# Patient Record
Sex: FEMALE | Race: WHITE | NOT HISPANIC OR LATINO | URBAN - METROPOLITAN AREA
[De-identification: names, ages, dates, MRNs, and addresses within clinical notes are randomized per-mention and may not be internally consistent; named-entity substitution may affect disease eponyms.]

---

## 2020-01-01 ENCOUNTER — EMERGENCY (EMERGENCY)
Facility: HOSPITAL | Age: 35
LOS: 1 days | Discharge: HOME | End: 2020-01-01
Attending: EMERGENCY MEDICINE | Admitting: EMERGENCY MEDICINE
Payer: COMMERCIAL

## 2020-01-01 VITALS
SYSTOLIC BLOOD PRESSURE: 105 MMHG | TEMPERATURE: 98 F | OXYGEN SATURATION: 100 % | DIASTOLIC BLOOD PRESSURE: 69 MMHG | HEART RATE: 83 BPM | RESPIRATION RATE: 16 BRPM

## 2020-01-01 VITALS — DIASTOLIC BLOOD PRESSURE: 58 MMHG | SYSTOLIC BLOOD PRESSURE: 100 MMHG

## 2020-01-01 PROCEDURE — 99284 EMERGENCY DEPT VISIT MOD MDM: CPT

## 2020-01-01 RX ORDER — ONDANSETRON 8 MG/1
4 TABLET, FILM COATED ORAL ONCE
Refills: 0 | Status: COMPLETED | OUTPATIENT
Start: 2020-01-01 | End: 2020-01-01

## 2020-01-01 RX ADMIN — ONDANSETRON 4 MILLIGRAM(S): 8 TABLET, FILM COATED ORAL at 01:48

## 2020-01-01 NOTE — ED PROVIDER NOTE - PATIENT PORTAL LINK FT
You can access the FollowMyHealth Patient Portal offered by [facility] by registering at the following website: [email or web address]. By joining [facility]’s FollowMyHealth portal, you will also be able to view your health information using other applications (apps) compatible with our system.

## 2020-01-01 NOTE — ED ADULT NURSE NOTE - NSIMPLEMENTINTERV_GEN_ALL_ED
Implemented All Fall Risk Interventions:  [place] to call system. Call bell, personal items and telephone within reach. Instruct patient to call for assistance. Room bathroom lighting operational. Non-slip footwear when patient is off stretcher. Physically safe environment: no spills, clutter or unnecessary equipment. Stretcher in lowest position, wheels locked, appropriate side rails in place. Provide visual cue, wrist band, yellow gown, etc. Monitor gait and stability. Monitor for mental status changes and reorient to person, place, and time. Review medications for side effects contributing to fall risk. Reinforce activity limits and safety measures with patient and family.

## 2020-01-01 NOTE — ED ADULT TRIAGE NOTE - CHIEF COMPLAINT QUOTE
Pt brought in by EMS after pt was found on the street on [address]. Pt admits to drinking alcohol tonight. Pt vomiting at triage.

## 2020-01-01 NOTE — ED PROVIDER NOTE - OBJECTIVE STATEMENT
34 yof pw bibems for AMS, possibly 2/2 substance use, no trauma reported.  limited history 2/2 mental status.

## 2020-01-01 NOTE — ED ADULT NURSE NOTE - OBJECTIVE STATEMENT
Pt is a 34y female BIBEMS for altered mental status. Pt admits to drinking. Denies drug use. Denies fall. No obvious sings of trauma noted.

## 2020-01-07 DIAGNOSIS — F10.129 ALCOHOL ABUSE WITH INTOXICATION, UNSPECIFIED: ICD-10-CM

## 2020-01-07 DIAGNOSIS — R41.82 ALTERED MENTAL STATUS, UNSPECIFIED: ICD-10-CM

## 2022-06-09 ENCOUNTER — EMERGENCY (EMERGENCY)
Facility: HOSPITAL | Age: 37
LOS: 1 days | Discharge: HOME | End: 2022-06-09
Admitting: EMERGENCY MEDICINE
Payer: COMMERCIAL

## 2022-06-09 VITALS
OXYGEN SATURATION: 98 % | DIASTOLIC BLOOD PRESSURE: 77 MMHG | HEART RATE: 74 BPM | RESPIRATION RATE: 16 BRPM | WEIGHT: 134.92 LBS | TEMPERATURE: 98 F | HEIGHT: 69 IN | SYSTOLIC BLOOD PRESSURE: 117 MMHG

## 2022-06-09 PROCEDURE — 93970 EXTREMITY STUDY: CPT | Mod: 26

## 2022-06-09 PROCEDURE — 99284 EMERGENCY DEPT VISIT MOD MDM: CPT

## 2022-06-09 NOTE — ED PROVIDER NOTE - PATIENT PORTAL LINK FT
You can access the FollowMyHealth Patient Portal offered by [facility] by registering at the following website: [email or web address]. By joining [facility]’s FollowMyHealth portal, you will also be able to view your health information using other applications (apps) compatible with our system.

## 2022-06-09 NOTE — ED PROVIDER NOTE - OBJECTIVE STATEMENT
38 yo F, h/o AVM, DVT [not on AC], presenting to the ED c/o R lower extremity swelling and mild discomfort for the past day. She states she was previously on Xarelto for her clot, but was switched to. 36 yo F, h/o AVM, DVT [not on AC], presenting to the ED c/o R lower extremity swelling and mild discomfort for the past day. She states she was previously on Xarelto for her clot, but the medication was stopped 1 year ago by her vascular surgeon. 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 She denies direct falls/trauma to the area, headaches, dizziness, fevers, chills, chest pain or SOB.

## 2022-06-09 NOTE — ED PROVIDER NOTE - WET READ LAUNCH FT
There are no Wet Read(s) to document. admitted for inabilaty to ambualte. attempte dwith darco shoe and posterior splint but unable to.

## 2022-06-09 NOTE — ED PROVIDER NOTE - PROGRESS NOTE DETAILS
No DVT noted on US  Will DC - patient has vascular f/u next week  She is stable on DC I received a call from pt's work physician, Dr. Rich - she states the patient had simeon iliac vein stents placed a year ago and she wanted those areas to be evaluated directly w/ imaging. I explained to Dr. Rich that the information communicated w/ me was that patient only needed DVT r/o. I called the patient back and after explaining this info to her, requested she come back to the ER for imaging to evaluate stents. Pt states she is home at this moment, but she is agreeable to return in the morning [7am] for further w/u. I called back Dr. Rich and explained these things to her - she states she will likely reach out to patient and just have her go to [facility] since it is physically closer to the pt's home.

## 2022-06-09 NOTE — ED ADULT TRIAGE NOTE - CHIEF COMPLAINT QUOTE
Pt w/ PMH of DVT on LLE presents c/o RLE swelling x1 month after AVM repair.  Pt denies cough or SOB.

## 2022-06-09 NOTE — ED PROVIDER NOTE - CLINICAL SUMMARY MEDICAL DECISION MAKING FREE TEXT BOX
36 yo F, h/o AVM, DVT [not on AC], presenting to the ED c/o R lower extremity swelling and mild discomfort for the past day. Patient found to have swelling of the RLE [below the knee] w/o distinct calf ttp on exam. Will send for US to r/o DVT. Reassess.

## 2022-06-09 NOTE — ED ADULT NURSE NOTE - OBJECTIVE STATEMENT
c/o LLE swelling and tightness, noticed today while seeing the doctor at her workplace. was initially to be seen for a cough for the past 2 weeks, MD became concerned for DVT due to h/o clots. no on ACs, OCPs. +sedentary lifetsy. follows up with vascular Surgeon for avm repair to leg

## 2022-06-11 DIAGNOSIS — Z79.01 LONG TERM (CURRENT) USE OF ANTICOAGULANTS: ICD-10-CM

## 2022-06-11 DIAGNOSIS — Z86.718 PERSONAL HISTORY OF OTHER VENOUS THROMBOSIS AND EMBOLISM: ICD-10-CM

## 2022-06-11 DIAGNOSIS — M79.89 OTHER SPECIFIED SOFT TISSUE DISORDERS: ICD-10-CM

## 2022-11-10 ENCOUNTER — APPOINTMENT (OUTPATIENT)
Dept: OTOLARYNGOLOGY | Facility: CLINIC | Age: 37
End: 2022-11-10

## 2022-11-10 VITALS — BODY MASS INDEX: 18.48 KG/M2 | HEIGHT: 66 IN | WEIGHT: 115 LBS

## 2022-11-10 VITALS — TEMPERATURE: 96.6 F

## 2022-11-10 DIAGNOSIS — Z87.09 PERSONAL HISTORY OF OTHER DISEASES OF THE RESPIRATORY SYSTEM: ICD-10-CM

## 2022-11-10 DIAGNOSIS — R44.8 OTHER SYMPTOMS AND SIGNS INVOLVING GENERAL SENSATIONS AND PERCEPTIONS: ICD-10-CM

## 2022-11-10 DIAGNOSIS — Z82.5 FAMILY HISTORY OF ASTHMA AND OTHER CHRONIC LOWER RESPIRATORY DISEASES: ICD-10-CM

## 2022-11-10 DIAGNOSIS — Z72.89 OTHER PROBLEMS RELATED TO LIFESTYLE: ICD-10-CM

## 2022-11-10 DIAGNOSIS — Z78.9 OTHER SPECIFIED HEALTH STATUS: ICD-10-CM

## 2022-11-10 DIAGNOSIS — J31.0 CHRONIC RHINITIS: ICD-10-CM

## 2022-11-10 PROCEDURE — 31231 NASAL ENDOSCOPY DX: CPT

## 2022-11-10 RX ORDER — ESCITALOPRAM OXALATE 5 MG/1
5 TABLET ORAL
Qty: 30 | Refills: 0 | Status: ACTIVE | COMMUNITY
Start: 2022-05-18

## 2022-11-10 RX ORDER — LORATADINE 5 MG/5 ML
SOLUTION, ORAL ORAL
Refills: 0 | Status: ACTIVE | COMMUNITY

## 2022-11-10 RX ORDER — ENOXAPARIN SODIUM 60 MG/.6ML
60 INJECTION, SOLUTION SUBCUTANEOUS
Qty: 42 | Refills: 0 | Status: ACTIVE | COMMUNITY
Start: 2022-05-09

## 2022-11-10 RX ORDER — DILTIAZEM HYDROCHLORIDE 120 MG/1
120 CAPSULE, EXTENDED RELEASE ORAL
Qty: 30 | Refills: 0 | Status: ACTIVE | COMMUNITY
Start: 2022-10-05

## 2022-11-10 RX ORDER — KETOROLAC TROMETHAMINE 10 MG/1
10 TABLET, FILM COATED ORAL
Qty: 9 | Refills: 0 | Status: ACTIVE | COMMUNITY
Start: 2022-05-11

## 2022-11-10 RX ORDER — FLUCONAZOLE 150 MG/1
150 TABLET ORAL
Qty: 1 | Refills: 0 | Status: ACTIVE | COMMUNITY
Start: 2022-09-21

## 2022-11-10 RX ORDER — BENZONATATE 100 MG/1
100 CAPSULE ORAL
Qty: 20 | Refills: 0 | Status: ACTIVE | COMMUNITY
Start: 2022-07-12

## 2022-11-10 RX ORDER — BROMPHENIRAMINE MALEATE, PSEUDOEPHEDRINE HYDROCHLORIDE, 2; 30; 10 MG/5ML; MG/5ML; MG/5ML
30-2-10 SYRUP ORAL
Qty: 200 | Refills: 0 | Status: ACTIVE | COMMUNITY
Start: 2022-07-16

## 2022-11-10 RX ORDER — ESCITALOPRAM OXALATE 10 MG/1
10 TABLET ORAL
Qty: 30 | Refills: 0 | Status: ACTIVE | COMMUNITY
Start: 2022-05-18

## 2022-11-10 RX ORDER — RIVAROXABAN 15 MG/1
15 TABLET, FILM COATED ORAL
Qty: 30 | Refills: 0 | Status: ACTIVE | COMMUNITY
Start: 2022-11-02

## 2022-11-10 RX ORDER — LIDOCAINE HYDROCHLORIDE 20 MG/ML
2 SOLUTION ORAL; TOPICAL
Qty: 200 | Refills: 0 | Status: ACTIVE | COMMUNITY
Start: 2022-07-13

## 2022-11-10 RX ORDER — ESCITALOPRAM OXALATE 20 MG/1
20 TABLET ORAL
Qty: 90 | Refills: 0 | Status: ACTIVE | COMMUNITY
Start: 2022-09-14

## 2022-11-10 RX ORDER — LABETALOL HYDROCHLORIDE 100 MG/1
100 TABLET, FILM COATED ORAL
Qty: 45 | Refills: 0 | Status: ACTIVE | COMMUNITY
Start: 2022-11-01

## 2022-11-10 NOTE — HISTORY OF PRESENT ILLNESS
[de-identified] : Patient status post functional endoscopic sinus surgery and possible septoplasty about 10 years ago on Jerm[place].  This helped with sinus symptoms, but more recently they have recurred.  Now reports history of maxillary, retro-orbital, and frontal pressure for many years.  She does have allergies but does not know exactly what to do.  Is on Flonase, Claritin-D, and Alix pot irrigation without significant improvement.  Notes 3-4 episodes of acute rhinosinusitis per year treated with antibiotics, last in July treated with Augmentin

## 2022-11-10 NOTE — ASSESSMENT
[FreeTextEntry1] : Longstanding facial pain and pressure status post functional endoscopic sinus surgery.  The right middle meatus appears to be open with a retention cyst at the base.  The left middle meatus is boggy and there is a small obstructive polyp.  Recommended allergy testing and a CT scan of the sinuses with fusion protocol.  She will also add Astelin to her current Flonase regimen to see if she improves.  Follow-up thereafter and consider further options.  We discussed that if there is no evidence of sinus etiology of her facial pain and pressure she would likely benefit from seeing a neurologist, allergist or ophthalmologist